# Patient Record
Sex: FEMALE | Race: WHITE | HISPANIC OR LATINO
[De-identification: names, ages, dates, MRNs, and addresses within clinical notes are randomized per-mention and may not be internally consistent; named-entity substitution may affect disease eponyms.]

---
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
## 2022-03-07 PROBLEM — Z00.00 ENCOUNTER FOR PREVENTIVE HEALTH EXAMINATION: Status: ACTIVE | Noted: 2022-03-07

## 2022-05-04 ENCOUNTER — APPOINTMENT (OUTPATIENT)
Dept: OBGYN | Facility: CLINIC | Age: 22
End: 2022-05-04
Payer: COMMERCIAL

## 2022-05-04 VITALS
BODY MASS INDEX: 25.21 KG/M2 | HEART RATE: 66 BPM | HEIGHT: 62 IN | DIASTOLIC BLOOD PRESSURE: 81 MMHG | SYSTOLIC BLOOD PRESSURE: 118 MMHG | WEIGHT: 137 LBS

## 2022-05-04 DIAGNOSIS — Z11.3 ENCOUNTER FOR SCREENING FOR INFECTIONS WITH A PREDOMINANTLY SEXUAL MODE OF TRANSMISSION: ICD-10-CM

## 2022-05-04 DIAGNOSIS — Z87.42 PERSONAL HISTORY OF OTHER DISEASES OF THE FEMALE GENITAL TRACT: ICD-10-CM

## 2022-05-04 DIAGNOSIS — Z01.419 ENCOUNTER FOR GYNECOLOGICAL EXAMINATION (GENERAL) (ROUTINE) W/OUT ABNORMAL FINDINGS: ICD-10-CM

## 2022-05-04 PROCEDURE — 99212 OFFICE O/P EST SF 10 MIN: CPT | Mod: 25

## 2022-05-04 PROCEDURE — 99385 PREV VISIT NEW AGE 18-39: CPT

## 2022-05-04 NOTE — HISTORY OF PRESENT ILLNESS
[FreeTextEntry1] : 21-year-old female presents for well woman exam.  She is a new patient to our practice.  Her last annual exam was 1 year ago.  She would like to discuss her menses today.  Menarche was at the age of 12.  Menses are every 28 to 60 days, lasting 7 days.  She states her menses were monthly until she turned 15.  She states since she turned 15 her menses have been irregular.  She has never skipped more than 2 months at a time.  She denies heavy bleeding.  She does get cramping with her longer cycles.  She does not take medications, states she toughs about.  She has no significant GYN history.  She recently saw a dermatologist and was diagnosed with acne.  She is starting spironolactone 50 mg daily.  She denies hirsutism.  She is sexually active.  She is not using anything for contraception.  She is not planning on getting pregnant.  She has no other significant medical history.

## 2022-05-04 NOTE — PLAN
[FreeTextEntry1] : 21-year-old female well woman exam\par \par 1.  Pelvic exam done with GC cultures\par 2.  Self breast exam teaching done\par 3.  Patient with history of irregular menstrual cycles and acne.  Differential diagnosis reviewed with the patient.  We discussed that she most likely has PCOS.  She was counseled on PCOS.  Plan to get day 3 labs.  She will return to the office for transvaginal sonogram and to review the blood work results.  We discussed ways to regulate her symptoms.  She is interested in starting a combined birth control pill to help regulate her cycles.  She was given a prescription for spironolactone by dermatology.  She plans to try to start this soon.  She understands she cannot get pregnant while she is taking spironolactone.  We discussed that she should use condoms for contraception until her next visit.  The patient was given the opportunity to ask questions and all were answered to her satisfaction.\par 4.  Annual exam in 1 year

## 2022-05-06 LAB
C TRACH RRNA SPEC QL NAA+PROBE: NOT DETECTED
N GONORRHOEA RRNA SPEC QL NAA+PROBE: NOT DETECTED
SOURCE AMPLIFICATION: NORMAL

## 2022-05-23 LAB
BASOPHILS # BLD AUTO: 0.05 K/UL
BASOPHILS NFR BLD AUTO: 0.6 %
CHOLEST SERPL-MCNC: 210 MG/DL
EOSINOPHIL # BLD AUTO: 0.15 K/UL
EOSINOPHIL NFR BLD AUTO: 1.7 %
ESTRADIOL SERPL-MCNC: 26 PG/ML
FSH SERPL-MCNC: 5.3 IU/L
HCG SERPL-MCNC: <1 MIU/ML
HCT VFR BLD CALC: 45.5 %
HDLC SERPL-MCNC: 41 MG/DL
HGB BLD-MCNC: 14.6 G/DL
IMM GRANULOCYTES NFR BLD AUTO: 0.3 %
INSULIN P FAST SERPL-ACNC: 19.7 UU/ML
LDLC SERPL CALC-MCNC: 140 MG/DL
LH SERPL-ACNC: 12.4 IU/L
LYMPHOCYTES # BLD AUTO: 3.81 K/UL
LYMPHOCYTES NFR BLD AUTO: 43.6 %
MAN DIFF?: NORMAL
MCHC RBC-ENTMCNC: 26.7 PG
MCHC RBC-ENTMCNC: 32.1 GM/DL
MCV RBC AUTO: 83.3 FL
MONOCYTES # BLD AUTO: 0.47 K/UL
MONOCYTES NFR BLD AUTO: 5.4 %
NEUTROPHILS # BLD AUTO: 4.22 K/UL
NEUTROPHILS NFR BLD AUTO: 48.4 %
NONHDLC SERPL-MCNC: 169 MG/DL
PLATELET # BLD AUTO: 327 K/UL
PROGEST SERPL-MCNC: 0.1 NG/ML
PROLACTIN SERPL-MCNC: 15.1 NG/ML
RBC # BLD: 5.46 M/UL
RBC # FLD: 13.2 %
TRIGL SERPL-MCNC: 144 MG/DL
TSH SERPL-ACNC: 1.7 UIU/ML
WBC # FLD AUTO: 8.73 K/UL

## 2022-05-25 LAB — SHBG SERPL-SCNC: 19.2 NMOL/L

## 2022-05-27 ENCOUNTER — NON-APPOINTMENT (OUTPATIENT)
Age: 22
End: 2022-05-27

## 2022-05-30 LAB — 17OHP SERPL-MCNC: 46 NG/DL

## 2022-06-15 ENCOUNTER — ASOB RESULT (OUTPATIENT)
Age: 22
End: 2022-06-15

## 2022-06-15 ENCOUNTER — APPOINTMENT (OUTPATIENT)
Dept: ANTEPARTUM | Facility: CLINIC | Age: 22
End: 2022-06-15
Payer: COMMERCIAL

## 2022-06-15 ENCOUNTER — TRANSCRIPTION ENCOUNTER (OUTPATIENT)
Age: 22
End: 2022-06-15

## 2022-06-15 ENCOUNTER — APPOINTMENT (OUTPATIENT)
Dept: OBGYN | Facility: CLINIC | Age: 22
End: 2022-06-15

## 2022-06-15 VITALS
SYSTOLIC BLOOD PRESSURE: 90 MMHG | BODY MASS INDEX: 24.84 KG/M2 | WEIGHT: 135 LBS | HEIGHT: 62 IN | DIASTOLIC BLOOD PRESSURE: 60 MMHG

## 2022-06-15 DIAGNOSIS — N92.6 IRREGULAR MENSTRUATION, UNSPECIFIED: ICD-10-CM

## 2022-06-15 DIAGNOSIS — Z87.2 PERSONAL HISTORY OF DISEASES OF THE SKIN AND SUBCUTANEOUS TISSUE: ICD-10-CM

## 2022-06-15 PROCEDURE — 76856 US EXAM PELVIC COMPLETE: CPT | Mod: 59

## 2022-06-15 PROCEDURE — 99214 OFFICE O/P EST MOD 30 MIN: CPT

## 2022-06-15 PROCEDURE — 76830 TRANSVAGINAL US NON-OB: CPT

## 2022-06-26 ENCOUNTER — NON-APPOINTMENT (OUTPATIENT)
Age: 22
End: 2022-06-26

## 2022-06-30 ENCOUNTER — APPOINTMENT (OUTPATIENT)
Dept: OBGYN | Facility: CLINIC | Age: 22
End: 2022-06-30

## 2022-06-30 ENCOUNTER — RESULT CHARGE (OUTPATIENT)
Age: 22
End: 2022-06-30

## 2022-06-30 VITALS
BODY MASS INDEX: 24.84 KG/M2 | SYSTOLIC BLOOD PRESSURE: 100 MMHG | WEIGHT: 135 LBS | HEIGHT: 62 IN | DIASTOLIC BLOOD PRESSURE: 70 MMHG

## 2022-06-30 DIAGNOSIS — N92.6 IRREGULAR MENSTRUATION, UNSPECIFIED: ICD-10-CM

## 2022-06-30 PROCEDURE — 99214 OFFICE O/P EST MOD 30 MIN: CPT

## 2022-06-30 NOTE — PLAN
[FreeTextEntry1] : Irregular menses/pcos\par \par could be related to recent medication increase, rec. pt start on junel 1/5/30 -RBAD- aware of dvt and emboli risks\par rt in 3 months

## 2022-06-30 NOTE — HISTORY OF PRESENT ILLNESS
[FreeTextEntry1] : 21 yo here c/o having her period three times in the past 6 weeks, not heavy, regular amount of blood for a period. Her spirolactone was increased to 100mg end of april.\par H/o pcos

## 2022-07-06 ENCOUNTER — RX ONLY (RX ONLY)
Age: 22
End: 2022-07-06

## 2022-07-06 ENCOUNTER — OFFICE (OUTPATIENT)
Dept: URBAN - METROPOLITAN AREA CLINIC 113 | Facility: CLINIC | Age: 22
Setting detail: OPHTHALMOLOGY
End: 2022-07-06
Payer: COMMERCIAL
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
## 2022-07-06 DIAGNOSIS — B30.9: ICD-10-CM

## 2022-07-06 DIAGNOSIS — H16.223: ICD-10-CM

## 2022-07-06 LAB — HCG UR QL: NEGATIVE

## 2022-07-06 PROCEDURE — 99204 OFFICE O/P NEW MOD 45 MIN: CPT | Performed by: STUDENT IN AN ORGANIZED HEALTH CARE EDUCATION/TRAINING PROGRAM

## 2022-07-06 ASSESSMENT — KERATOMETRY
OS_K1POWER_DIOPTERS: 41.50
OS_AXISANGLE_DEGREES: 094
OD_K1POWER_DIOPTERS: 41.50
OS_K2POWER_DIOPTERS: 44.25
OD_AXISANGLE_DEGREES: 083
OD_K2POWER_DIOPTERS: 44.00

## 2022-07-06 ASSESSMENT — CORNEAL SURGICAL SCARRING
OS_SCARRING: ANTERIOR STROMAL
OD_SCARRING: ANTERIOR STROMAL

## 2022-07-06 ASSESSMENT — REFRACTION_CURRENTRX
OS_VPRISM_DIRECTION: SV
OS_OVR_VA: 20/
OD_AXIS: 164
OD_CYLINDER: -0.75
OS_CYLINDER: -0.75
OS_SPHERE: -3.50
OD_VPRISM_DIRECTION: SV
OS_AXIS: 001
OD_OVR_VA: 20/
OD_SPHERE: -3.50

## 2022-07-06 ASSESSMENT — FILAMENTARY KERATITIS SEVERITY (FKS)
OD_FKS: MILD
OS_FKS: MILD

## 2022-07-06 ASSESSMENT — TEAR BREAK UP TIME (TBUT)
OS_TBUT: 2+
OD_TBUT: 2+

## 2022-07-06 ASSESSMENT — REFRACTION_AUTOREFRACTION
OS_AXIS: 006
OD_SPHERE: -4.75
OD_CYLINDER: -2.25
OS_CYLINDER: -2.25
OD_AXIS: 172
OS_SPHERE: -4.75

## 2022-07-06 ASSESSMENT — CONFRONTATIONAL VISUAL FIELD TEST (CVF)
OS_FINDINGS: FULL
OD_FINDINGS: FULL

## 2022-07-06 ASSESSMENT — AXIALLENGTH_DERIVED
OD_AL: 26.4691
OS_AL: 26.4114

## 2022-07-06 ASSESSMENT — VISUAL ACUITY
OS_BCVA: 20/60
OD_BCVA: 20/50

## 2022-07-06 ASSESSMENT — SPHEQUIV_DERIVED
OS_SPHEQUIV: -5.875
OD_SPHEQUIV: -5.875

## 2022-07-06 ASSESSMENT — SUPERFICIAL PUNCTATE KERATITIS (SPK)
OD_SPK: 2+ 3+
OS_SPK: 2+ 3+

## 2022-07-08 ENCOUNTER — OFFICE (OUTPATIENT)
Dept: URBAN - METROPOLITAN AREA CLINIC 113 | Facility: CLINIC | Age: 22
Setting detail: OPHTHALMOLOGY
End: 2022-07-08
Payer: COMMERCIAL
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
## 2022-07-08 DIAGNOSIS — H10.412: ICD-10-CM

## 2022-07-08 DIAGNOSIS — H10.411: ICD-10-CM

## 2022-07-08 DIAGNOSIS — H10.413: ICD-10-CM

## 2022-07-08 DIAGNOSIS — H16.223: ICD-10-CM

## 2022-07-08 PROCEDURE — 99213 OFFICE O/P EST LOW 20 MIN: CPT | Performed by: OPHTHALMOLOGY

## 2022-07-08 ASSESSMENT — FILAMENTARY KERATITIS SEVERITY (FKS)
OS_FKS: MILD
OD_FKS: MILD

## 2022-07-08 ASSESSMENT — REFRACTION_CURRENTRX
OD_AXIS: 164
OS_CYLINDER: -0.75
OD_VPRISM_DIRECTION: SV
OD_CYLINDER: -0.75
OS_VPRISM_DIRECTION: SV
OS_OVR_VA: 20/
OD_SPHERE: -3.50
OD_OVR_VA: 20/
OS_SPHERE: -3.50
OS_AXIS: 001

## 2022-07-08 ASSESSMENT — LID EXAM ASSESSMENTS
OD_COMMENTS: + LID FLIPPED GPC
OS_COMMENTS: + LID FLIPPED GPC

## 2022-07-08 ASSESSMENT — SPHEQUIV_DERIVED
OD_SPHEQUIV: -5.25
OS_SPHEQUIV: -5.75

## 2022-07-08 ASSESSMENT — TONOMETRY
OD_IOP_MMHG: 16
OS_IOP_MMHG: 18

## 2022-07-08 ASSESSMENT — REFRACTION_AUTOREFRACTION
OS_SPHERE: -4.50
OS_AXIS: 004
OD_SPHERE: -4.25
OD_AXIS: 169
OD_CYLINDER: -2.00
OS_CYLINDER: -2.50

## 2022-07-08 ASSESSMENT — KERATOMETRY
OD_K1POWER_DIOPTERS: 41.50
OD_K2POWER_DIOPTERS: 44.00
OS_AXISANGLE_DEGREES: 094
OS_K2POWER_DIOPTERS: 44.25
OS_K1POWER_DIOPTERS: 41.50
OD_AXISANGLE_DEGREES: 083

## 2022-07-08 ASSESSMENT — VISUAL ACUITY
OD_BCVA: 20/40
OS_BCVA: 20/30

## 2022-07-08 ASSESSMENT — TEAR BREAK UP TIME (TBUT)
OD_TBUT: 2+
OS_TBUT: 2+

## 2022-07-08 ASSESSMENT — AXIALLENGTH_DERIVED
OS_AL: 26.3505
OD_AL: 26.166

## 2022-07-08 ASSESSMENT — CONFRONTATIONAL VISUAL FIELD TEST (CVF)
OS_FINDINGS: FULL
OD_FINDINGS: FULL

## 2022-07-08 ASSESSMENT — SUPERFICIAL PUNCTATE KERATITIS (SPK)
OD_SPK: T
OS_SPK: T

## 2022-07-08 ASSESSMENT — CORNEAL SURGICAL SCARRING
OS_SCARRING: ANTERIOR STROMAL
OD_SCARRING: ANTERIOR STROMAL

## 2022-07-20 PROBLEM — Z87.2 HISTORY OF ACNE: Status: RESOLVED | Noted: 2022-05-04 | Resolved: 2022-07-20

## 2022-07-20 PROBLEM — N92.6 IRREGULAR MENSTRUAL CYCLE: Status: ACTIVE | Noted: 2022-05-04

## 2022-07-20 NOTE — HISTORY OF PRESENT ILLNESS
[FreeTextEntry1] : 22-year-old female presents for well woman exam.  Menarche was at the age of 12.  Menses are every 28 to 60 days, lasting 7 days.  She states her menses were monthly until she turned 15.  She states since she turned 15 her menses have been irregular.  She has never skipped more than 2 months at a time.  She denies heavy bleeding.  She does get cramping with her longer cycles.  She does not take medications, states she toughs about.  She has no significant GYN history.  She recently saw a dermatologist and was diagnosed with acne.  She is taking spironolactone 100 mg daily which is an increase in the dose since her last visit.  She denies hirsutism.  She is sexually active.  She is not using anything for contraception.  She is not planning on getting pregnant.  She has no other significant medical history.

## 2022-07-20 NOTE — PLAN
[FreeTextEntry1] : 22-year-old female with history of irregular menstrual cycle and acne to review day 3 blood work and sono results.  Day 3 blood work reviewed with patient.  She is aware that she has an elevated fasting insulin.  We discussed the utility of metformin.  She is not interested in starting metformin at this time.  Her LH to FSH ratio is 2:1.  She is aware that this is suggestive of PCOS but no longer diagnostic.  She also has a low sex hormone binding globulin which is also suggestive of PCOS.  Her cholesterol was slightly elevated at 210.  Her LDL was 140 and HDL was 41.  We discussed diet and exercise to decrease her cholesterol.  We also discussed that she should follow-up with her PCP.  We discussed PCOS.  We discussed the diagnosis of PCOS.  We discussed treatment for PCOS.  She is not interested in starting hormones to regulate her cycle at this time.  We discussed if she goes more than 3 months without a period she should return to the office for blood work and a sonogram.  We discussed the patients with PCOS have a higher risk of developing high blood pressure, diabetes, and metabolic syndrome in her lifetime.  We discussed the patients with PCOS can have more difficulty getting pregnant and due to their irregular menstrual cycles.  We discussed the increased risk of endometrial hyperplasia and cancer in patients were not getting her menses at least every 3 months.  The patient was given the opportunity to ask questions and all were answered to her satisfaction.  Plan to repeat cholesterol, hemoglobin A1c, and fasting insulin in 3 months.  Prescription was provided for the patient.  We will call with the results.

## 2022-08-05 ENCOUNTER — OFFICE (OUTPATIENT)
Dept: URBAN - METROPOLITAN AREA CLINIC 113 | Facility: CLINIC | Age: 22
Setting detail: OPHTHALMOLOGY
End: 2022-08-05
Payer: COMMERCIAL

## 2022-08-05 DIAGNOSIS — H10.412: ICD-10-CM

## 2022-08-05 DIAGNOSIS — H16.223: ICD-10-CM

## 2022-08-05 DIAGNOSIS — H10.411: ICD-10-CM

## 2022-08-05 DIAGNOSIS — H16.221: ICD-10-CM

## 2022-08-05 DIAGNOSIS — H10.413: ICD-10-CM

## 2022-08-05 DIAGNOSIS — H16.222: ICD-10-CM
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
## 2022-08-05 PROBLEM — B30.9 VIRAL CONJUNCTIVITIS: Status: ACTIVE | Noted: 2022-07-06

## 2022-08-05 PROCEDURE — 83861 MICROFLUID ANALY TEARS: CPT | Performed by: OPHTHALMOLOGY

## 2022-08-05 PROCEDURE — 99213 OFFICE O/P EST LOW 20 MIN: CPT | Performed by: OPHTHALMOLOGY

## 2022-08-05 ASSESSMENT — VISUAL ACUITY
OS_BCVA: 20/50
OD_BCVA: 20/40

## 2022-08-05 ASSESSMENT — KERATOMETRY
OS_K1POWER_DIOPTERS: 41.50
OD_K1POWER_DIOPTERS: 41.50
OD_AXISANGLE_DEGREES: 083
OS_K2POWER_DIOPTERS: 44.25
OD_K2POWER_DIOPTERS: 44.00
OS_AXISANGLE_DEGREES: 094

## 2022-08-05 ASSESSMENT — REFRACTION_CURRENTRX
OS_AXIS: 001
OD_OVR_VA: 20/
OS_CYLINDER: -0.75
OS_OVR_VA: 20/
OD_SPHERE: -3.50
OD_VPRISM_DIRECTION: SV
OD_AXIS: 164
OD_CYLINDER: -0.75
OS_SPHERE: -3.50
OS_VPRISM_DIRECTION: SV

## 2022-08-05 ASSESSMENT — CORNEAL SURGICAL SCARRING
OD_SCARRING: ANTERIOR STROMAL
OS_SCARRING: ANTERIOR STROMAL

## 2022-08-05 ASSESSMENT — AXIALLENGTH_DERIVED
OS_AL: 26.3505
OD_AL: 26.166

## 2022-08-05 ASSESSMENT — REFRACTION_AUTOREFRACTION
OS_SPHERE: -4.50
OS_CYLINDER: -2.50
OS_AXIS: 004
OD_SPHERE: -4.25
OD_CYLINDER: -2.00
OD_AXIS: 169

## 2022-08-05 ASSESSMENT — FILAMENTARY KERATITIS SEVERITY (FKS)
OD_FKS: MILD
OS_FKS: MILD

## 2022-08-05 ASSESSMENT — TEAR BREAK UP TIME (TBUT)
OD_TBUT: 2+
OS_TBUT: 2+

## 2022-08-05 ASSESSMENT — SPHEQUIV_DERIVED
OD_SPHEQUIV: -5.25
OS_SPHEQUIV: -5.75

## 2022-08-05 ASSESSMENT — CONFRONTATIONAL VISUAL FIELD TEST (CVF)
OD_FINDINGS: FULL
OS_FINDINGS: FULL

## 2022-08-05 ASSESSMENT — TONOMETRY
OS_IOP_MMHG: 19
OD_IOP_MMHG: 18

## 2022-08-05 ASSESSMENT — SUPERFICIAL PUNCTATE KERATITIS (SPK)
OS_SPK: T 1+
OD_SPK: T 1+

## 2022-10-15 LAB
CHOLEST SERPL-MCNC: 219 MG/DL
ESTIMATED AVERAGE GLUCOSE: 111 MG/DL
HBA1C MFR BLD HPLC: 5.5 %
HDLC SERPL-MCNC: 40 MG/DL
INSULIN P FAST SERPL-ACNC: 16.1 UU/ML
LDLC SERPL CALC-MCNC: 144 MG/DL
NONHDLC SERPL-MCNC: 180 MG/DL
TRIGL SERPL-MCNC: 180 MG/DL

## 2022-10-18 ENCOUNTER — APPOINTMENT (OUTPATIENT)
Dept: OBGYN | Facility: CLINIC | Age: 22
End: 2022-10-18

## 2022-10-18 DIAGNOSIS — E28.2 POLYCYSTIC OVARIAN SYNDROME: ICD-10-CM

## 2022-10-18 PROCEDURE — 99213 OFFICE O/P EST LOW 20 MIN: CPT | Mod: 95

## 2022-10-18 RX ORDER — NORETHINDRONE ACETATE AND ETHINYL ESTRADIOL AND FERROUS FUMARATE 1.5-30(21)
1.5-3 KIT ORAL DAILY
Qty: 3 | Refills: 2 | Status: ACTIVE | COMMUNITY
Start: 2022-06-30 | End: 1900-01-01

## 2022-10-18 NOTE — REASON FOR VISIT
[Home] : at home, [unfilled] , at the time of the visit. [Medical Office: ([facility])___] : at the medical office located in  [Follow-Up] : a follow-up evaluation of

## 2022-10-18 NOTE — PLAN
[FreeTextEntry1] : 22-year-old female with PCOS and (irregular menstrual cycles, dysmenorrhea, and acne).  She was started on Microgestin 1.5/30.  She is happy with the pill and would like to continue.  We were unable to do a blood pressure check today as this is a telehealth visit.  The patient states she has a follow-up with her orthopedic surgeon tomorrow and will have her blood pressure checked at that visit.  She states that she is elevated she will call the office.  Rx provided for Microgestin 1.5/30 x 9 months.  All risk, benefits and potential complications of combined OCP reviewed with the patient.\par \par We also discussed the results of her recent blood work.  Her fasting insulin and hemoglobin A1c were within normal limits.  Her cholesterol is still elevated.  We discussed the utility of diet and exercise and decreasing cholesterol levels.  Advised the patient that at this time as her cholesterol is still elevated she should follow-up with her primary care physician.\par \par The patient was given the opportunity to ask questions and all were answered to her satisfaction.  She will return to the office in May 2023 for her well woman exam.
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
## 2022-12-06 ENCOUNTER — NON-APPOINTMENT (OUTPATIENT)
Age: 22
End: 2022-12-06

## 2023-01-17 ENCOUNTER — NON-APPOINTMENT (OUTPATIENT)
Age: 23
End: 2023-01-17

## 2023-01-28 ENCOUNTER — OFFICE (OUTPATIENT)
Dept: URBAN - METROPOLITAN AREA CLINIC 112 | Facility: CLINIC | Age: 23
Setting detail: OPHTHALMOLOGY
End: 2023-01-28
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
## 2023-01-28 PROCEDURE — NO SHOW FE NO SHOW FEE: Performed by: OPHTHALMOLOGY

## 2023-05-17 ENCOUNTER — APPOINTMENT (OUTPATIENT)
Dept: OBGYN | Facility: CLINIC | Age: 23
End: 2023-05-17

## 2023-12-01 ENCOUNTER — NON-APPOINTMENT (OUTPATIENT)
Age: 23
End: 2023-12-01

## 2024-10-03 ENCOUNTER — APPOINTMENT (OUTPATIENT)
Dept: ORTHOPEDIC SURGERY | Facility: CLINIC | Age: 24
End: 2024-10-03

## 2024-10-15 ENCOUNTER — APPOINTMENT (OUTPATIENT)
Dept: ORTHOPEDIC SURGERY | Facility: CLINIC | Age: 24
End: 2024-10-15

## 2025-04-29 ENCOUNTER — NON-APPOINTMENT (OUTPATIENT)
Age: 25
End: 2025-04-29